# Patient Record
Sex: FEMALE | Race: WHITE | NOT HISPANIC OR LATINO | Employment: OTHER | URBAN - METROPOLITAN AREA
[De-identification: names, ages, dates, MRNs, and addresses within clinical notes are randomized per-mention and may not be internally consistent; named-entity substitution may affect disease eponyms.]

---

## 2023-03-30 ENCOUNTER — HOSPITAL ENCOUNTER (OUTPATIENT)
Dept: GENERAL RADIOLOGY | Facility: HOSPITAL | Age: 73
Discharge: HOME | End: 2023-03-30
Admitting: NURSE PRACTITIONER
Payer: MEDICARE

## 2023-03-30 ENCOUNTER — OFFICE VISIT (OUTPATIENT)
Dept: FAMILY MEDICINE CLINIC | Facility: CLINIC | Age: 73
End: 2023-03-30
Payer: MEDICARE

## 2023-03-30 VITALS
OXYGEN SATURATION: 97 % | TEMPERATURE: 97.1 F | DIASTOLIC BLOOD PRESSURE: 71 MMHG | HEART RATE: 107 BPM | HEIGHT: 66 IN | SYSTOLIC BLOOD PRESSURE: 111 MMHG | BODY MASS INDEX: 27.32 KG/M2 | WEIGHT: 170 LBS

## 2023-03-30 DIAGNOSIS — R06.09 DYSPNEA ON EXERTION: ICD-10-CM

## 2023-03-30 DIAGNOSIS — Z79.890 HORMONE REPLACEMENT THERAPY: ICD-10-CM

## 2023-03-30 DIAGNOSIS — R05.3 CHRONIC COUGH: Primary | ICD-10-CM

## 2023-03-30 DIAGNOSIS — Z13.220 SCREENING FOR LIPOID DISORDERS: ICD-10-CM

## 2023-03-30 DIAGNOSIS — Z91.09 HISTORY OF ENVIRONMENTAL ALLERGIES: ICD-10-CM

## 2023-03-30 DIAGNOSIS — R05.3 CHRONIC COUGH: ICD-10-CM

## 2023-03-30 DIAGNOSIS — Z11.59 ENCOUNTER FOR HEPATITIS C SCREENING TEST FOR LOW RISK PATIENT: ICD-10-CM

## 2023-03-30 DIAGNOSIS — F31.9 BIPOLAR 1 DISORDER, DEPRESSED: ICD-10-CM

## 2023-03-30 DIAGNOSIS — R73.09 ELEVATED GLUCOSE: ICD-10-CM

## 2023-03-30 PROCEDURE — 99204 OFFICE O/P NEW MOD 45 MIN: CPT | Performed by: NURSE PRACTITIONER

## 2023-03-30 PROCEDURE — 1160F RVW MEDS BY RX/DR IN RCRD: CPT | Performed by: NURSE PRACTITIONER

## 2023-03-30 PROCEDURE — 93000 ELECTROCARDIOGRAM COMPLETE: CPT | Performed by: NURSE PRACTITIONER

## 2023-03-30 PROCEDURE — 1159F MED LIST DOCD IN RCRD: CPT | Performed by: NURSE PRACTITIONER

## 2023-03-30 PROCEDURE — 71046 X-RAY EXAM CHEST 2 VIEWS: CPT

## 2023-03-30 RX ORDER — ALBUTEROL SULFATE 90 UG/1
2 AEROSOL, METERED RESPIRATORY (INHALATION) EVERY 4 HOURS PRN
Qty: 18 G | Refills: 1 | Status: SHIPPED | OUTPATIENT
Start: 2023-03-30

## 2023-03-30 RX ORDER — ESTRADIOL 2 MG/1
TABLET ORAL
COMMUNITY
Start: 2023-02-13

## 2023-03-30 RX ORDER — DESIPRAMINE HYDROCHLORIDE 150 MG/1
TABLET ORAL
COMMUNITY
Start: 2023-03-18

## 2023-03-30 RX ORDER — SERTRALINE HYDROCHLORIDE 100 MG/1
200 TABLET, FILM COATED ORAL DAILY
COMMUNITY
Start: 2023-03-18

## 2023-03-31 LAB
ALBUMIN SERPL-MCNC: 4.3 G/DL (ref 3.5–5.2)
ALBUMIN/GLOB SERPL: 1.7 G/DL
ALP SERPL-CCNC: 97 U/L (ref 39–117)
ALT SERPL-CCNC: 15 U/L (ref 1–33)
AST SERPL-CCNC: 20 U/L (ref 1–32)
BASOPHILS # BLD AUTO: 0.05 10*3/MM3 (ref 0–0.2)
BASOPHILS NFR BLD AUTO: 0.5 % (ref 0–1.5)
BILIRUB SERPL-MCNC: 0.2 MG/DL (ref 0–1.2)
BUN SERPL-MCNC: 17 MG/DL (ref 8–23)
BUN/CREAT SERPL: 14.3 (ref 7–25)
CALCIUM SERPL-MCNC: 9.9 MG/DL (ref 8.6–10.5)
CHLORIDE SERPL-SCNC: 100 MMOL/L (ref 98–107)
CHOLEST SERPL-MCNC: 236 MG/DL (ref 0–200)
CO2 SERPL-SCNC: 29 MMOL/L (ref 22–29)
CREAT SERPL-MCNC: 1.19 MG/DL (ref 0.57–1)
EGFRCR SERPLBLD CKD-EPI 2021: 48.4 ML/MIN/1.73
EOSINOPHIL # BLD AUTO: 0.02 10*3/MM3 (ref 0–0.4)
EOSINOPHIL NFR BLD AUTO: 0.2 % (ref 0.3–6.2)
ERYTHROCYTE [DISTWIDTH] IN BLOOD BY AUTOMATED COUNT: 13.5 % (ref 12.3–15.4)
GLOBULIN SER CALC-MCNC: 2.6 GM/DL
GLUCOSE SERPL-MCNC: 93 MG/DL (ref 65–99)
HBA1C MFR BLD: 5.2 % (ref 4.8–5.6)
HCT VFR BLD AUTO: 41.7 % (ref 34–46.6)
HCV IGG SERPL QL IA: NON REACTIVE
HDLC SERPL-MCNC: 111 MG/DL (ref 40–60)
HGB BLD-MCNC: 13.9 G/DL (ref 12–15.9)
IMM GRANULOCYTES # BLD AUTO: 0.03 10*3/MM3 (ref 0–0.05)
IMM GRANULOCYTES NFR BLD AUTO: 0.3 % (ref 0–0.5)
LDLC SERPL CALC-MCNC: 100 MG/DL (ref 0–100)
LDLC/HDLC SERPL: 0.86 {RATIO}
LYMPHOCYTES # BLD AUTO: 2.62 10*3/MM3 (ref 0.7–3.1)
LYMPHOCYTES NFR BLD AUTO: 27.6 % (ref 19.6–45.3)
MCH RBC QN AUTO: 27.6 PG (ref 26.6–33)
MCHC RBC AUTO-ENTMCNC: 33.3 G/DL (ref 31.5–35.7)
MCV RBC AUTO: 82.9 FL (ref 79–97)
MONOCYTES # BLD AUTO: 0.75 10*3/MM3 (ref 0.1–0.9)
MONOCYTES NFR BLD AUTO: 7.9 % (ref 5–12)
NEUTROPHILS # BLD AUTO: 6.01 10*3/MM3 (ref 1.7–7)
NEUTROPHILS NFR BLD AUTO: 63.5 % (ref 42.7–76)
NRBC BLD AUTO-RTO: 0 /100 WBC (ref 0–0.2)
PLATELET # BLD AUTO: 365 10*3/MM3 (ref 140–450)
POTASSIUM SERPL-SCNC: 5.2 MMOL/L (ref 3.5–5.2)
PROT SERPL-MCNC: 6.9 G/DL (ref 6–8.5)
RBC # BLD AUTO: 5.03 10*6/MM3 (ref 3.77–5.28)
SODIUM SERPL-SCNC: 138 MMOL/L (ref 136–145)
TRIGL SERPL-MCNC: 150 MG/DL (ref 0–150)
TSH SERPL DL<=0.005 MIU/L-ACNC: 4.19 UIU/ML (ref 0.27–4.2)
VIT B12 SERPL-MCNC: 663 PG/ML (ref 211–946)
VLDLC SERPL CALC-MCNC: 25 MG/DL (ref 5–40)
WBC # BLD AUTO: 9.48 10*3/MM3 (ref 3.4–10.8)

## 2023-04-05 ENCOUNTER — TELEPHONE (OUTPATIENT)
Dept: FAMILY MEDICINE CLINIC | Facility: CLINIC | Age: 73
End: 2023-04-05

## 2023-04-25 ENCOUNTER — OFFICE VISIT (OUTPATIENT)
Dept: FAMILY MEDICINE CLINIC | Facility: CLINIC | Age: 73
End: 2023-04-25
Payer: MEDICARE

## 2023-04-25 VITALS
DIASTOLIC BLOOD PRESSURE: 83 MMHG | HEART RATE: 75 BPM | WEIGHT: 181 LBS | TEMPERATURE: 97.5 F | HEIGHT: 66 IN | BODY MASS INDEX: 29.09 KG/M2 | SYSTOLIC BLOOD PRESSURE: 130 MMHG

## 2023-04-25 DIAGNOSIS — Z12.31 ENCOUNTER FOR SCREENING MAMMOGRAM FOR MALIGNANT NEOPLASM OF BREAST: ICD-10-CM

## 2023-04-25 DIAGNOSIS — R05.3 CHRONIC COUGH: Primary | ICD-10-CM

## 2023-04-25 PROCEDURE — 1159F MED LIST DOCD IN RCRD: CPT | Performed by: NURSE PRACTITIONER

## 2023-04-25 PROCEDURE — 1160F RVW MEDS BY RX/DR IN RCRD: CPT | Performed by: NURSE PRACTITIONER

## 2023-04-25 PROCEDURE — 99214 OFFICE O/P EST MOD 30 MIN: CPT | Performed by: NURSE PRACTITIONER

## 2023-04-25 RX ORDER — ALBUTEROL SULFATE 90 UG/1
2 AEROSOL, METERED RESPIRATORY (INHALATION) EVERY 4 HOURS PRN
Qty: 18 G | Refills: 1 | Status: SHIPPED | OUTPATIENT
Start: 2023-04-25

## 2023-04-25 RX ORDER — RIZATRIPTAN BENZOATE 10 MG/1
10 TABLET ORAL ONCE AS NEEDED
COMMUNITY

## 2023-04-25 RX ORDER — FLUTICASONE FUROATE, UMECLIDINIUM BROMIDE AND VILANTEROL TRIFENATATE 100; 62.5; 25 UG/1; UG/1; UG/1
1 POWDER RESPIRATORY (INHALATION)
Qty: 1 EACH | Refills: 0
Start: 2023-04-25

## 2023-08-04 ENCOUNTER — TELEPHONE (OUTPATIENT)
Dept: FAMILY MEDICINE CLINIC | Facility: CLINIC | Age: 73
End: 2023-08-04

## 2024-01-22 ENCOUNTER — HOSPITAL ENCOUNTER (OUTPATIENT)
Dept: CARDIOLOGY | Facility: HOSPITAL | Age: 74
Discharge: HOME | End: 2024-01-22
Payer: MEDICARE

## 2024-01-22 ENCOUNTER — TRANSCRIBE ORDERS (OUTPATIENT)
Dept: ADMINISTRATIVE | Facility: HOSPITAL | Age: 74
End: 2024-01-22
Payer: MEDICARE

## 2024-01-22 DIAGNOSIS — Z79.899 ENCOUNTER FOR LONG-TERM (CURRENT) USE OF OTHER MEDICATIONS: Primary | ICD-10-CM

## 2024-01-22 DIAGNOSIS — Z79.899 ENCOUNTER FOR LONG-TERM (CURRENT) USE OF OTHER MEDICATIONS: ICD-10-CM

## 2024-01-22 LAB
QT INTERVAL: 324 MS
QTC INTERVAL: 416 MS

## 2024-01-22 PROCEDURE — 93005 ELECTROCARDIOGRAM TRACING: CPT

## 2024-01-22 PROCEDURE — 93010 ELECTROCARDIOGRAM REPORT: CPT | Performed by: INTERNAL MEDICINE

## 2025-05-14 ENCOUNTER — APPOINTMENT (OUTPATIENT)
Dept: CT IMAGING | Facility: HOSPITAL | Age: 75
End: 2025-05-14
Payer: MEDICARE

## 2025-05-14 ENCOUNTER — HOSPITAL ENCOUNTER (EMERGENCY)
Facility: HOSPITAL | Age: 75
Discharge: HOME | End: 2025-05-14
Attending: EMERGENCY MEDICINE | Admitting: EMERGENCY MEDICINE
Payer: MEDICARE

## 2025-05-14 VITALS
WEIGHT: 175 LBS | BODY MASS INDEX: 28.12 KG/M2 | HEART RATE: 81 BPM | TEMPERATURE: 98 F | RESPIRATION RATE: 16 BRPM | SYSTOLIC BLOOD PRESSURE: 135 MMHG | HEIGHT: 66 IN | DIASTOLIC BLOOD PRESSURE: 81 MMHG | OXYGEN SATURATION: 96 %

## 2025-05-14 DIAGNOSIS — R20.0 RIGHT LEG NUMBNESS: ICD-10-CM

## 2025-05-14 DIAGNOSIS — R20.0 LEFT LEG NUMBNESS: ICD-10-CM

## 2025-05-14 DIAGNOSIS — R20.0 LEFT ARM NUMBNESS: Primary | ICD-10-CM

## 2025-05-14 LAB
ALBUMIN SERPL-MCNC: 3.9 G/DL (ref 3.5–5.2)
ALBUMIN/GLOB SERPL: 1.3 G/DL
ALP SERPL-CCNC: 119 U/L (ref 39–117)
ALT SERPL W P-5'-P-CCNC: 15 U/L (ref 1–33)
ANION GAP SERPL CALCULATED.3IONS-SCNC: 9.3 MMOL/L (ref 5–15)
AST SERPL-CCNC: 20 U/L (ref 1–32)
BASOPHILS # BLD AUTO: 0.05 10*3/MM3 (ref 0–0.2)
BASOPHILS NFR BLD AUTO: 0.4 % (ref 0–1.5)
BILIRUB SERPL-MCNC: 0.4 MG/DL (ref 0–1.2)
BUN SERPL-MCNC: 22 MG/DL (ref 8–23)
BUN/CREAT SERPL: 19.8 (ref 7–25)
CALCIUM SPEC-SCNC: 9.5 MG/DL (ref 8.6–10.5)
CHLORIDE SERPL-SCNC: 101 MMOL/L (ref 98–107)
CO2 SERPL-SCNC: 22.7 MMOL/L (ref 22–29)
CREAT SERPL-MCNC: 1.11 MG/DL (ref 0.57–1)
DEPRECATED RDW RBC AUTO: 42.3 FL (ref 37–54)
EGFRCR SERPLBLD CKD-EPI 2021: 51.6 ML/MIN/1.73
EOSINOPHIL # BLD AUTO: 0.05 10*3/MM3 (ref 0–0.4)
EOSINOPHIL NFR BLD AUTO: 0.4 % (ref 0.3–6.2)
ERYTHROCYTE [DISTWIDTH] IN BLOOD BY AUTOMATED COUNT: 13.4 % (ref 12.3–15.4)
GLOBULIN UR ELPH-MCNC: 2.9 GM/DL
GLUCOSE SERPL-MCNC: 91 MG/DL (ref 65–99)
HCT VFR BLD AUTO: 42.5 % (ref 34–46.6)
HGB BLD-MCNC: 13.9 G/DL (ref 12–15.9)
IMM GRANULOCYTES # BLD AUTO: 0.02 10*3/MM3 (ref 0–0.05)
IMM GRANULOCYTES NFR BLD AUTO: 0.2 % (ref 0–0.5)
LYMPHOCYTES # BLD AUTO: 3.87 10*3/MM3 (ref 0.7–3.1)
LYMPHOCYTES NFR BLD AUTO: 32.8 % (ref 19.6–45.3)
MAGNESIUM SERPL-MCNC: 2.2 MG/DL (ref 1.6–2.4)
MCH RBC QN AUTO: 28.5 PG (ref 26.6–33)
MCHC RBC AUTO-ENTMCNC: 32.7 G/DL (ref 31.5–35.7)
MCV RBC AUTO: 87.3 FL (ref 79–97)
MONOCYTES # BLD AUTO: 0.88 10*3/MM3 (ref 0.1–0.9)
MONOCYTES NFR BLD AUTO: 7.5 % (ref 5–12)
NEUTROPHILS NFR BLD AUTO: 58.7 % (ref 42.7–76)
NEUTROPHILS NFR BLD AUTO: 6.93 10*3/MM3 (ref 1.7–7)
NRBC BLD AUTO-RTO: 0 /100 WBC (ref 0–0.2)
PHOSPHATE SERPL-MCNC: 2.8 MG/DL (ref 2.5–4.5)
PLATELET # BLD AUTO: 347 10*3/MM3 (ref 140–450)
PMV BLD AUTO: 9.3 FL (ref 6–12)
POTASSIUM SERPL-SCNC: 4.5 MMOL/L (ref 3.5–5.2)
PROT SERPL-MCNC: 6.8 G/DL (ref 6–8.5)
RBC # BLD AUTO: 4.87 10*6/MM3 (ref 3.77–5.28)
SODIUM SERPL-SCNC: 133 MMOL/L (ref 136–145)
WBC NRBC COR # BLD AUTO: 11.8 10*3/MM3 (ref 3.4–10.8)

## 2025-05-14 PROCEDURE — 70450 CT HEAD/BRAIN W/O DYE: CPT

## 2025-05-14 PROCEDURE — 93005 ELECTROCARDIOGRAM TRACING: CPT | Performed by: EMERGENCY MEDICINE

## 2025-05-14 PROCEDURE — 99284 EMERGENCY DEPT VISIT MOD MDM: CPT

## 2025-05-14 PROCEDURE — 72125 CT NECK SPINE W/O DYE: CPT

## 2025-05-14 PROCEDURE — 80053 COMPREHEN METABOLIC PANEL: CPT | Performed by: NURSE PRACTITIONER

## 2025-05-14 PROCEDURE — 85025 COMPLETE CBC W/AUTO DIFF WBC: CPT | Performed by: NURSE PRACTITIONER

## 2025-05-14 PROCEDURE — 93010 ELECTROCARDIOGRAM REPORT: CPT | Performed by: INTERNAL MEDICINE

## 2025-05-14 PROCEDURE — 84100 ASSAY OF PHOSPHORUS: CPT | Performed by: NURSE PRACTITIONER

## 2025-05-14 PROCEDURE — 93005 ELECTROCARDIOGRAM TRACING: CPT

## 2025-05-14 PROCEDURE — 83735 ASSAY OF MAGNESIUM: CPT | Performed by: NURSE PRACTITIONER

## 2025-05-14 RX ORDER — SODIUM CHLORIDE 0.9 % (FLUSH) 0.9 %
10 SYRINGE (ML) INJECTION AS NEEDED
Status: DISCONTINUED | OUTPATIENT
Start: 2025-05-14 | End: 2025-05-14 | Stop reason: HOSPADM

## 2025-05-14 NOTE — ED NOTES
Pt presents to ED with c/o left arm and leg numbness starting Sunday today spreading to right leg with dizziness starting Sunday as well.

## 2025-05-14 NOTE — ED PROVIDER NOTES
" EMERGENCY DEPARTMENT ENCOUNTER    Room Number:  02/02  Date seen:  5/14/2025  PCP: Ree Bryant APRN  Historian/Independent historian: n/a  Chronic or social conditions impacting care: age      HPI:  Chief Complaint: numbness  A complete HPI/ROS/PMH/PSH/SH/FH are unobtainable due to: n/a  Context: Patti Canchola is a 75 y.o. female who presents to the ED c/o acute numbness.  Patient states that on Sunday she went for a 20-minute walk and began feeling like her right leg was asleep.  She had no tingling, pain, weakness to that leg.  She states since then it has subsided.  She states that sometime tonight she began having the same \"heavy\" feeling to her left arm and leg.  No weakness, tingling, pain or swelling.  She denies any headache, changes in her vision, chest pain, palpitations, abdominal pain, nausea, vomiting, diarrhea.  States that she does feel little lightheaded.  She has been eating and drinking normally.  No dizziness.  No difficulty with speech or ambulation.  Is not on any anticoagulation.    External Medical record review:   She was seen at the urgent care center today and diagnosed with dizziness numbness and tingling and sent to the ER for further evaluation    4/25/2023: Family med visit for chronic cough prescribed albuterol and trilogy Ellipta    PAST MEDICAL HISTORY  Active Ambulatory Problems     Diagnosis Date Noted    No Active Ambulatory Problems     Resolved Ambulatory Problems     Diagnosis Date Noted    No Resolved Ambulatory Problems     Past Medical History:   Diagnosis Date    Bipolar affect, depressed          PAST SURGICAL HISTORY  Past Surgical History:   Procedure Laterality Date    ABDOMINAL SURGERY      abd surgery suspected ovarian cyst at age 15    TONSILLECTOMY           FAMILY HISTORY  No family history on file.      SOCIAL HISTORY  Social History     Socioeconomic History    Marital status: Single   Tobacco Use    Smoking status: Former     Types: Cigarettes    " Smokeless tobacco: Never   Substance and Sexual Activity    Alcohol use: Yes     Alcohol/week: 3.0 standard drinks of alcohol     Types: 3 Glasses of wine per week     Comment: a glass or two of wine a night    Drug use: Yes     Types: Marijuana         ALLERGIES  Benzalkonium chloride and Sulfa antibiotics        REVIEW OF SYSTEMS  Per HPI, otherwise negative.       PHYSICAL EXAM  ED Triage Vitals   Temp Heart Rate Resp BP SpO2   05/14/25 1918 05/14/25 1918 05/14/25 1918 05/14/25 1926 05/14/25 1918   97.6 °F (36.4 °C) 119 18 139/76 97 %      Temp src Heart Rate Source Patient Position BP Location FiO2 (%)   05/14/25 1918 05/14/25 1918 -- -- --   Tympanic Monitor          Physical Exam  Vitals and nursing note reviewed.   Constitutional:       Appearance: Normal appearance.   HENT:      Head: Normocephalic and atraumatic.      Mouth/Throat:      Mouth: Mucous membranes are moist.   Eyes:      Extraocular Movements: Extraocular movements intact.      Conjunctiva/sclera: Conjunctivae normal.      Pupils: Pupils are equal, round, and reactive to light.   Cardiovascular:      Rate and Rhythm: Normal rate and regular rhythm.      Pulses: Normal pulses.      Heart sounds: Normal heart sounds.   Pulmonary:      Effort: Pulmonary effort is normal.      Breath sounds: Normal breath sounds. No wheezing.   Abdominal:      General: Bowel sounds are normal.      Palpations: Abdomen is soft.      Tenderness: There is no abdominal tenderness. There is no guarding.   Musculoskeletal:         General: Normal range of motion.      Cervical back: Normal range of motion and neck supple. No tenderness.      Comments: No midline cervical spine tenderness. No obvious step-offs or deformities. Strength 5/5 equal to BUE. Sensation intact to light touch. FROM.    No midline thoracic spine tenderness. No obvious step-off or deformities.     No lumbar spine tenderness. Negative straight-leg exam bilaterally. Strength 5/5 and equal to BLE.  Sensation intact to light touch. FROM.     Skin:     General: Skin is warm.      Capillary Refill: Capillary refill takes less than 2 seconds.   Neurological:      General: No focal deficit present.      Mental Status: She is alert and oriented to person, place, and time. Mental status is at baseline.      Comments: NIH 0   Psychiatric:         Mood and Affect: Mood normal.               LAB RESULTS  Recent Results (from the past 24 hours)   ECG 12 Lead Other; numbness    Collection Time: 05/14/25  7:21 PM   Result Value Ref Range    QT Interval 340 ms    QTC Interval 415 ms   Comprehensive Metabolic Panel    Collection Time: 05/14/25  7:45 PM    Specimen: Blood   Result Value Ref Range    Glucose 91 65 - 99 mg/dL    BUN 22 8 - 23 mg/dL    Creatinine 1.11 (H) 0.57 - 1.00 mg/dL    Sodium 133 (L) 136 - 145 mmol/L    Potassium 4.5 3.5 - 5.2 mmol/L    Chloride 101 98 - 107 mmol/L    CO2 22.7 22.0 - 29.0 mmol/L    Calcium 9.5 8.6 - 10.5 mg/dL    Total Protein 6.8 6.0 - 8.5 g/dL    Albumin 3.9 3.5 - 5.2 g/dL    ALT (SGPT) 15 1 - 33 U/L    AST (SGOT) 20 1 - 32 U/L    Alkaline Phosphatase 119 (H) 39 - 117 U/L    Total Bilirubin 0.4 0.0 - 1.2 mg/dL    Globulin 2.9 gm/dL    A/G Ratio 1.3 g/dL    BUN/Creatinine Ratio 19.8 7.0 - 25.0    Anion Gap 9.3 5.0 - 15.0 mmol/L    eGFR 51.6 (L) >60.0 mL/min/1.73   Magnesium    Collection Time: 05/14/25  7:45 PM    Specimen: Blood   Result Value Ref Range    Magnesium 2.2 1.6 - 2.4 mg/dL   CBC Auto Differential    Collection Time: 05/14/25  7:45 PM    Specimen: Blood   Result Value Ref Range    WBC 11.80 (H) 3.40 - 10.80 10*3/mm3    RBC 4.87 3.77 - 5.28 10*6/mm3    Hemoglobin 13.9 12.0 - 15.9 g/dL    Hematocrit 42.5 34.0 - 46.6 %    MCV 87.3 79.0 - 97.0 fL    MCH 28.5 26.6 - 33.0 pg    MCHC 32.7 31.5 - 35.7 g/dL    RDW 13.4 12.3 - 15.4 %    RDW-SD 42.3 37.0 - 54.0 fl    MPV 9.3 6.0 - 12.0 fL    Platelets 347 140 - 450 10*3/mm3    Neutrophil % 58.7 42.7 - 76.0 %    Lymphocyte % 32.8 19.6 -  45.3 %    Monocyte % 7.5 5.0 - 12.0 %    Eosinophil % 0.4 0.3 - 6.2 %    Basophil % 0.4 0.0 - 1.5 %    Immature Grans % 0.2 0.0 - 0.5 %    Neutrophils, Absolute 6.93 1.70 - 7.00 10*3/mm3    Lymphocytes, Absolute 3.87 (H) 0.70 - 3.10 10*3/mm3    Monocytes, Absolute 0.88 0.10 - 0.90 10*3/mm3    Eosinophils, Absolute 0.05 0.00 - 0.40 10*3/mm3    Basophils, Absolute 0.05 0.00 - 0.20 10*3/mm3    Immature Grans, Absolute 0.02 0.00 - 0.05 10*3/mm3    nRBC 0.0 0.0 - 0.2 /100 WBC   Phosphorus    Collection Time: 05/14/25  7:45 PM    Specimen: Blood   Result Value Ref Range    Phosphorus 2.8 2.5 - 4.5 mg/dL       Ordered the above labs and reviewed the results.        RADIOLOGY  CT Head Without Contrast  CT Head Without Contrast, CT Cervical Spine Without Contrast  Result Date: 5/14/2025  CT HEAD WO CONTRAST-, CT CERVICAL SPINE WO CONTRAST-  INDICATIONS: Numbness  TECHNIQUE: Radiation dose reduction techniques were utilized, including automated exposure control and exposure modulation based on body size. Noncontrast head CT, cervical spine CT  COMPARISON: None available  FINDINGS:  Head CT:  No acute intracranial hemorrhage, midline shift or mass effect. No acute territorial infarct is identified.  Mild to moderate periventricular hypodensities suggest chronic small vessel ischemic change in a patient this age.  Arterial calcifications are seen at the base of the brain.  Ventricles, cisterns, cerebral sulci are unremarkable for patient age.  The visualized paranasal sinuses, orbits, mastoid air cells are unremarkable.   Cervical spine CT:  No acute fracture is identified. Mild anterolisthesis of C2 on C3, C3 on C4, C4 on C5, C7 on T1. Mild retrolisthesis of C5 on C6.  Facet and uncovertebral joint hypertrophy contribute to neuroforaminal narrowing, more prominent on the right at C3/4, C5/6.  Bilateral carotid arterial calcifications are present.       No acute intracranial hemorrhage or hydrocephalus. No acute cervical  fracture identified; degenerative changes in the cervical spine. If there is further clinical concern, MRI could be considered for further evaluation.  This report was finalized on 5/14/2025 8:43 PM by Dr. Kris Myers M.D on Workstation: [id number]        Ordered the above noted radiological studies. Reviewed by me in PACS.        MEDICATIONS GIVEN IN ER  Medications   sodium chloride 0.9 % flush 10 mL (has no administration in time range)           MEDICAL DECISION MAKING, PROGRESS, and CONSULTS    All labs have been independently reviewed by me.  All radiology studies have been reviewed by me and I have also reviewed the radiology report.   EKG's independently viewed and interpreted by me.  Discussion below represents my analysis of pertinent findings related to patient's condition, differential diagnosis, treatment plan and final disposition.    Patient presents with numbness to left arm and leg, right leg that has been intermittent.  She is able to differentiate the sensation, but states that patient feels like it is asleep.  Labs unremarkable.  CT head shows chronic small vessel disease with no prior comparisons.  CT cervical spine without severe stenosis. She was offered admission, but would like to be discharged home. Will have her follow up with neurology. Strict return precautions discussed          Shared decision making/consideration for admission:  offered admission, patient declined      Orders placed during this visit:  Orders Placed This Encounter   Procedures    CT Head Without Contrast    CT Cervical Spine Without Contrast    Comprehensive Metabolic Panel    Magnesium    CBC Auto Differential    Phosphorus    Vital Signs Recheck    Orthostatic Vitals    ECG 12 Lead Other; numbness    Insert Peripheral IV    CBC & Differential         Differential diagnosis include, but not limited to: CVA, electrolyte abnormality, spinal stenosis      Additional orders considered but not ordered: MRI brain-  patient declined on admission and will follow up with PCP/ neurology    Independent interpretation of labs, radiology studies, and discussions with consultants:    Discussed with Dr. Bills, who agrees with plan.     ED Course as of 05/14/25 2113   Wed May 14, 2025   2015 CT Head Without Contrast  My independent interpretation of the imaging study is no ICH [AB]   2016 CT Cervical Spine Without Contrast  My independent interpretation of the imaging study is retrolisthesis of C3, no acute fracture [AB]   2022 Phosphorus: 2.8 [JG]   2022 Magnesium: 2.2 [JG]   2022 BUN: 22 [JG]   2022 Creatinine(!): 1.11 [JG]   2022 Sodium(!): 133 [JG]   2022 WBC(!): 11.80 [JG]   2022 Hemoglobin: 13.9 [JG]   2022 Hematocrit: 42.5 [JG]   2022 Platelets: 347 [JG]   2022 Neutrophil Rel %: 58.7 [JG]   2022 Lymphocyte Rel %: 32.8 [JG]   2102 Reviewed labs and imaging with patient.  She states that she is feeling better.  I discussed the chronic small vessel changes and that we do not have prior imaging to compare with.  I offered her admission for further evaluation and neurology consult, but she states that she would like to be discharged home and will follow-up as an outpatient.  Strict return precautions discussed. [JG]      ED Course User Index  [AB] Anthony Bills MD  [JG] Antonieta Verdugo APRN             DIAGNOSIS  Final diagnoses:   Left arm numbness   Right leg numbness   Left leg numbness         DISPOSITION  discharge        Latest Documented Vital Signs:  As of 21:13 EDT  BP- 101/60 HR- 89 Temp- 97.6 °F (36.4 °C) (Tympanic) O2 sat- 98%              --    Please note that portions of this were completed with a voice recognition program.       Note Disclaimer: At [facility], we believe that sharing information builds trust and better relationships. You are receiving this note because you are receiving care at [facility] or recently visited. It is possible you will see health information before a provider has talked  with you about it. This kind of information can be easy to misunderstand. To help you fully understand what it means for your health, we urge you to discuss this note with your provider.             Antonieta Verdugo, SABIHA  05/14/25 [id number]

## 2025-05-14 NOTE — ED PROVIDER NOTES
MD ATTESTATION NOTE  I supervised care provided by the APC. We have discussed this patient's history, physical exam, and treatment plan. I have reviewed the APC's note and I agree with the APC's findings and plan of care.   SHARED VISIT: This visit was performed by BOTH a physician and an APC. The substantive portion of the medical decision making was performed by this attesting physician who made or approved the management plan and takes responsibility for patient management. All studies in the APC note (if performed) were independently interpreted by me.   I have personally had a face to face encounter with the patient.     PCP: Ree Bryant APRN  Patient Care Team:  Ree Bryant APRN as PCP - General (Nurse Practitioner)     Patti Canchola is a 75 y.o. female who presents to the ED c/o paresthesia.  Patient states that she has had paresthesia in the left upper and left lower extremity for the past 3 days.  She states that the symptoms have worsened.  No associated weakness.  She has now been having numbness and tingling in the right arm.  No headache, vision changes, speech changes, nausea or vomiting.  No neck pain or low back pain.  No change in symptoms with position changes, neck rotation or movement.    On exam:  General: NAD.  Head: NCAT.  ENT: nares patent, no scleral icterus  Neck: Supple, trachea midline.  Cardiac: regular rate and rhythm.  Lungs: normal effort, clear to auscultation bilaterally  Abdomen: Soft, nondistended, NTTP, no rebound tenderness, no guarding or rigidity  MS: No tenderness or step-offs along the C/T/L-spine  Neuro: EOMI, no facial droop, normal sensation to both sides of face, no dysarthria, no aphasia, 5/5 strength in upper extremities, normal sensation bilateral upper extremities, 5/5 strength in bilateral lower extremities, normal sensation bilateral lower extremities, normal coordination  Psych: calm, cooperative  Skin: Warm, dry.    Medical Decision Making:  After  the initial H&P, I discussed pertinent information from history and physical exam with patient/family.  Discussed differential diagnosis.  Discussed plan for ED evaluation/work-up/treatment.  All questions answered.  Patient/family is agreeable with plan.    ED Course as of 05/14/25 2114   Wed May 14, 2025   2015 CT Head Without Contrast  My independent interpretation of the imaging study is no ICH [AB]   2016 CT Cervical Spine Without Contrast  My independent interpretation of the imaging study is retrolisthesis of C3, no acute fracture [AB]   2022 Phosphorus: 2.8 [JG]   2022 Magnesium: 2.2 [JG]   2022 BUN: 22 [JG]   2022 Creatinine(!): 1.11 [JG]   2022 Sodium(!): 133 [JG]   2022 WBC(!): 11.80 [JG]   2022 Hemoglobin: 13.9 [JG]   2022 Hematocrit: 42.5 [JG]   2022 Platelets: 347 [JG]   2022 Neutrophil Rel %: 58.7 [JG]   2022 Lymphocyte Rel %: 32.8 [JG]   2102 Reviewed labs and imaging with patient.  She states that she is feeling better.  I discussed the chronic small vessel changes and that we do not have prior imaging to compare with.  I offered her admission for further evaluation and neurology consult, but she states that she would like to be discharged home and will follow-up as an outpatient.  Strict return precautions discussed. [JG]      ED Course User Index  [AB] Anthony Bills MD  [JG] Antonieta Verdugo APRN       Diagnosis  Final diagnoses:   Left arm numbness   Right leg numbness   Left leg numbness        Anthony Bills MD  05/14/25 2114

## 2025-05-15 LAB
QT INTERVAL: 340 MS
QTC INTERVAL: 415 MS

## 2025-05-15 NOTE — DISCHARGE INSTRUCTIONS
Follow-up with your primary care doctor and neurology as discussed  Return for headache, unilateral weakness, numbness, tingling, difficulty with speech or ambulation, chest pain, any new or worsening symptoms

## 2025-05-21 ENCOUNTER — OFFICE VISIT (OUTPATIENT)
Dept: FAMILY MEDICINE CLINIC | Facility: CLINIC | Age: 75
End: 2025-05-21
Payer: MEDICARE

## 2025-05-21 VITALS
WEIGHT: 172 LBS | OXYGEN SATURATION: 95 % | DIASTOLIC BLOOD PRESSURE: 56 MMHG | BODY MASS INDEX: 27.64 KG/M2 | HEIGHT: 66 IN | HEART RATE: 104 BPM | SYSTOLIC BLOOD PRESSURE: 104 MMHG

## 2025-05-21 DIAGNOSIS — M79.602 PARESTHESIA AND PAIN OF BOTH UPPER EXTREMITIES: Primary | ICD-10-CM

## 2025-05-21 DIAGNOSIS — R26.89 BALANCE PROBLEM: ICD-10-CM

## 2025-05-21 DIAGNOSIS — R20.2 PARESTHESIA OF LEFT LEG: ICD-10-CM

## 2025-05-21 DIAGNOSIS — R42 DIZZINESS: ICD-10-CM

## 2025-05-21 DIAGNOSIS — R20.2 PARESTHESIA AND PAIN OF BOTH UPPER EXTREMITIES: Primary | ICD-10-CM

## 2025-05-21 DIAGNOSIS — M79.601 PARESTHESIA AND PAIN OF BOTH UPPER EXTREMITIES: Primary | ICD-10-CM

## 2025-05-21 PROBLEM — J45.40 MODERATE PERSISTENT ASTHMA WITHOUT COMPLICATION: Status: ACTIVE | Noted: 2018-07-17

## 2025-05-21 PROBLEM — F33.1 MODERATE EPISODE OF RECURRENT MAJOR DEPRESSIVE DISORDER: Status: ACTIVE | Noted: 2018-02-13

## 2025-05-21 PROBLEM — J31.0 CHRONIC RHINITIS: Status: ACTIVE | Noted: 2018-06-26

## 2025-05-21 PROBLEM — R05.3 CHRONIC COUGH: Status: ACTIVE | Noted: 2017-08-30

## 2025-05-21 NOTE — PROGRESS NOTES
"[facility]  Primary Care    Chief Complaint  Hospital Follow Up Visit    Subjective        Patti Canchola presents to [facility]  History of Present Illness  Patti Canchola is a 75 y.o. female who presents today for follow-up after evaluation at [facility] ED on 5/14/2025. She presented for evaluation after experiencing numbness of her right leg that was followed by heaviness of her left arm and leg.   Workup included:   · CT Head Without Contrast - no acute abnormalities, chronic small vessel disease   · CT Cervical Spine Without Contrast - no fractures and only degenerative changes   · Comprehensive Metabolic Panel - Cr 1.11, Na 133, Alk Phos 119 and GFR 51.6  · Magnesium - normal   · CBC Auto Differential - WBC 11.8  · Phosphorus - normal   · Vital Signs Recheck  · Orthostatic Vitals  · ECG 12 Lead Other; numbness - NSR without ST changes     Workup revealed no explanation for her symptoms. Patient was discharged with advice to follow-up with neurology for further workup  She has been unable to get an appointment with neurology. She is still experiencing numbness and tingling in her left arm and leg and right arm. She denies associated pain. She does have occasional numbness of her mouth, dizziness, feeling off-balance and lightheadedness. She denies syncope, chest pain, headaches or speech difficulty. She denies any medication changes or use of new OTC supplements.       Review of Systems   Constitutional: Negative.    Eyes:  Negative for visual disturbance.   Respiratory: Negative.     Cardiovascular: Negative.    Neurological:  Positive for dizziness, light-headedness and numbness. Negative for tremors, syncope, facial asymmetry, speech difficulty, weakness and headaches.   Psychiatric/Behavioral:  Negative for confusion.         Objective   Vital Signs:  /56 (BP Location: Left arm, Patient Position: Sitting, Cuff Size: Adult)   Pulse 104   Ht 167.6 cm (66\") " "  Wt 78 kg (172 lb)   SpO2 95%   BMI 27.76 kg/m²   Estimated body mass index is 27.76 kg/m² as calculated from the following:    Height as of this encounter: 167.6 cm (66\").    Weight as of this encounter: 78 kg (172 lb).          Physical Exam  Vitals and nursing note reviewed.   Constitutional:       General: She is not in acute distress.     Appearance: Normal appearance.   Eyes:      Extraocular Movements: Extraocular movements intact.      Pupils: Pupils are equal, round, and reactive to light.   Cardiovascular:      Rate and Rhythm: Normal rate and regular rhythm.      Heart sounds: Normal heart sounds.   Pulmonary:      Effort: Pulmonary effort is normal.      Breath sounds: Normal breath sounds.   Skin:     General: Skin is warm and dry.   Neurological:      General: No focal deficit present.      Mental Status: She is alert and oriented to person, place, and time.      Cranial Nerves: No cranial nerve deficit.      Motor: No weakness.      Gait: Gait normal.   Psychiatric:         Mood and Affect: Mood normal.         Behavior: Behavior normal.        Result Review :                Assessment and Plan   Diagnoses and all orders for this visit:    1. Paresthesia and pain of both upper extremities (Primary)  -     MRI Brain With & Without Contrast; Future  -     Ambulatory Referral to Neurology  -     Vitamin B12  -     TSH  -     Heavy Metals, Blood    2. Paresthesia of left leg  -     MRI Brain With & Without Contrast; Future  -     Ambulatory Referral to Neurology  -     Vitamin B12  -     TSH  -     Heavy Metals, Blood    3. Dizziness  -     MRI Brain With & Without Contrast; Future  -     Ambulatory Referral to Neurology  -     Vitamin B12  -     TSH  -     Heavy Metals, Blood    4. Balance problem  -     MRI Brain With & Without Contrast; Future  -     Ambulatory Referral to Neurology  -     Vitamin B12  -     TSH  -     Heavy Metals, Blood    I do no suspect this is related to spine pathology given " symptoms in upper and lower extremities but being unilateral in the lower extremities and bilateral in the upper. Head CT in the ED showed no evidence of CVA.   Patient scheduled for MRI of brain  Updated labs to include TSH, B12 and heavy metals performed  Referral for neurology for further follow-up and management          Follow Up   No follow-ups on file.  Patient was given instructions and counseling regarding her condition or for health maintenance advice. Please see specific information pulled into the AVS if appropriate.           Gale VillarSEMAJ  [address]  [address]  [phone]

## 2025-05-28 ENCOUNTER — RESULTS FOLLOW-UP (OUTPATIENT)
Dept: FAMILY MEDICINE CLINIC | Facility: CLINIC | Age: 75
End: 2025-05-28

## 2025-05-28 LAB
ARSENIC BLD-MCNC: 1 UG/L (ref 0–9)
LEAD BLDV-MCNC: 1.5 UG/DL (ref 0–3.4)
MERCURY BLD-MCNC: <1 UG/L (ref 0–14.9)
TSH SERPL DL<=0.005 MIU/L-ACNC: 3.91 UIU/ML (ref 0.45–4.5)
VIT B12 SERPL-MCNC: 534 PG/ML (ref 232–1245)

## 2025-07-29 ENCOUNTER — HOSPITAL ENCOUNTER (OUTPATIENT)
Dept: MRI IMAGING | Facility: HOSPITAL | Age: 75
Discharge: HOME | End: 2025-07-29
Admitting: PHYSICIAN ASSISTANT
Payer: MEDICARE

## 2025-07-29 DIAGNOSIS — M79.602 PARESTHESIA AND PAIN OF BOTH UPPER EXTREMITIES: ICD-10-CM

## 2025-07-29 DIAGNOSIS — R42 DIZZINESS: ICD-10-CM

## 2025-07-29 DIAGNOSIS — R20.2 PARESTHESIA AND PAIN OF BOTH UPPER EXTREMITIES: ICD-10-CM

## 2025-07-29 DIAGNOSIS — R26.89 BALANCE PROBLEM: ICD-10-CM

## 2025-07-29 DIAGNOSIS — R20.2 PARESTHESIA OF LEFT LEG: ICD-10-CM

## 2025-07-29 DIAGNOSIS — M79.601 PARESTHESIA AND PAIN OF BOTH UPPER EXTREMITIES: ICD-10-CM

## 2025-07-29 PROCEDURE — 70553 MRI BRAIN STEM W/O & W/DYE: CPT

## 2025-07-29 PROCEDURE — A9577 INJ MULTIHANCE: HCPCS | Performed by: PHYSICIAN ASSISTANT

## 2025-07-29 PROCEDURE — 25510000002 GADOBENATE DIMEGLUMINE 529 MG/ML SOLUTION: Performed by: PHYSICIAN ASSISTANT

## 2025-07-29 RX ADMIN — GADOBENATE DIMEGLUMINE 16 ML: 529 INJECTION, SOLUTION INTRAVENOUS at 19:07

## 2025-07-31 ENCOUNTER — TELEPHONE (OUTPATIENT)
Dept: FAMILY MEDICINE CLINIC | Facility: CLINIC | Age: 75
End: 2025-07-31
Payer: MEDICARE

## 2025-07-31 NOTE — TELEPHONE ENCOUNTER
Received a call from radiology in regards to pts MRI results,  spoke to radiologist.    Pt has upcoming appt on 8/18 with Neuro, Please let me know if you would like to follow up with pt or what next steps will be

## 2025-07-31 NOTE — TELEPHONE ENCOUNTER
Attempted to call pt. No answer unable to LM due to mailbox being full    HUB TO RELAY    Due to VIRY Lane not being in the office today to receive the results on pts MRI ,  took the call.    He was trying to call pt to go over results. Pt can call back and request to speak with Gale Gaviria about results

## 2025-08-18 ENCOUNTER — OFFICE VISIT (OUTPATIENT)
Dept: NEUROLOGY | Facility: CLINIC | Age: 75
End: 2025-08-18
Payer: MEDICARE

## 2025-08-18 ENCOUNTER — PATIENT ROUNDING (BHMG ONLY) (OUTPATIENT)
Dept: NEUROLOGY | Facility: CLINIC | Age: 75
End: 2025-08-18
Payer: MEDICARE

## 2025-08-18 VITALS
OXYGEN SATURATION: 94 % | DIASTOLIC BLOOD PRESSURE: 64 MMHG | BODY MASS INDEX: 27 KG/M2 | HEART RATE: 103 BPM | SYSTOLIC BLOOD PRESSURE: 108 MMHG | WEIGHT: 168 LBS | HEIGHT: 66 IN

## 2025-08-18 DIAGNOSIS — R20.0 NUMBNESS AND TINGLING: ICD-10-CM

## 2025-08-18 DIAGNOSIS — Z86.73 HISTORY OF STROKE: Primary | ICD-10-CM

## 2025-08-18 DIAGNOSIS — R42 DIZZINESS: ICD-10-CM

## 2025-08-18 DIAGNOSIS — R20.2 NUMBNESS AND TINGLING: ICD-10-CM

## 2025-08-18 PROCEDURE — 99214 OFFICE O/P EST MOD 30 MIN: CPT | Performed by: NURSE PRACTITIONER

## 2025-08-18 PROCEDURE — 1160F RVW MEDS BY RX/DR IN RCRD: CPT | Performed by: NURSE PRACTITIONER

## 2025-08-18 PROCEDURE — 1159F MED LIST DOCD IN RCRD: CPT | Performed by: NURSE PRACTITIONER

## 2025-08-18 RX ORDER — ATORVASTATIN CALCIUM 20 MG/1
20 TABLET, FILM COATED ORAL DAILY
Qty: 90 TABLET | Refills: 3 | Status: SHIPPED | OUTPATIENT
Start: 2025-08-18 | End: 2026-08-18

## 2025-08-18 RX ORDER — ASPIRIN 81 MG/1
81 TABLET ORAL DAILY
Qty: 90 TABLET | Refills: 3 | Status: SHIPPED | OUTPATIENT
Start: 2025-08-18 | End: 2026-08-18